# Patient Record
Sex: MALE | Race: WHITE | NOT HISPANIC OR LATINO
[De-identification: names, ages, dates, MRNs, and addresses within clinical notes are randomized per-mention and may not be internally consistent; named-entity substitution may affect disease eponyms.]

---

## 2022-05-18 PROBLEM — Z00.00 ENCOUNTER FOR PREVENTIVE HEALTH EXAMINATION: Status: ACTIVE | Noted: 2022-05-18

## 2022-05-19 ENCOUNTER — NON-APPOINTMENT (OUTPATIENT)
Age: 32
End: 2022-05-19

## 2022-05-19 ENCOUNTER — APPOINTMENT (OUTPATIENT)
Dept: UROLOGY | Facility: CLINIC | Age: 32
End: 2022-05-19
Payer: COMMERCIAL

## 2022-05-19 VITALS
RESPIRATION RATE: 20 BRPM | HEART RATE: 95 BPM | OXYGEN SATURATION: 98 % | WEIGHT: 155 LBS | TEMPERATURE: 98 F | BODY MASS INDEX: 20.99 KG/M2 | SYSTOLIC BLOOD PRESSURE: 110 MMHG | HEIGHT: 72 IN | DIASTOLIC BLOOD PRESSURE: 74 MMHG

## 2022-05-19 LAB
BILIRUB UR QL STRIP: NORMAL
CLARITY UR: CLEAR
COLLECTION METHOD: NORMAL
GLUCOSE UR-MCNC: NORMAL
HCG UR QL: 0.2 EU/DL
HGB UR QL STRIP.AUTO: NORMAL
KETONES UR-MCNC: NORMAL
LEUKOCYTE ESTERASE UR QL STRIP: NORMAL
NITRITE UR QL STRIP: NORMAL
PH UR STRIP: 7
PROT UR STRIP-MCNC: NORMAL
SP GR UR STRIP: 1.01

## 2022-05-19 PROCEDURE — 81003 URINALYSIS AUTO W/O SCOPE: CPT | Mod: QW

## 2022-05-19 PROCEDURE — 99204 OFFICE O/P NEW MOD 45 MIN: CPT

## 2022-05-19 NOTE — ASSESSMENT
[FreeTextEntry1] : We discussed the patient's chronic ED and a prescription reorder the medication for him discount pharmacies.  The tadalafil will come from [facility] and the [facility]  I discussed with the patient in detail the optimal way to use these 2 medications along with the indications risks alternatives and chances for success.  We also discussed a morning hormone panel and this was ordered for his potential low testosterone level.  In this regard we also discussed the potential effect that the varicocele could have on his testosterone level along with the effect he could have on testicular size and future fertility potential he tells me that he is not interested in treatment future fertility or present fertility and we will continue to assess the varicocele for the other 2 potential effects.  As for the asymptomatic microscopic hematuria found today, I did send urine for a micro exam a culture and cytology.  I recommended that this patient follow-up with me in 1 month to retest the urine to review the hormone values and to assess his response to therapy. Kaci Erwin MD\par

## 2022-05-19 NOTE — HISTORY OF PRESENT ILLNESS
[FreeTextEntry1] : 33 YO M seen TODAY as NPT for ED. He told me that he has had this problem especially with new partners for the last 15 years. He uses sildenafil 20 mg x 3 and tadalafil 5 mg x 5 PRN interchangeably and some times together to achieve a firm erection. He typically gets these medications on line.  He has also been found to have  low T level, but is asymptomatic. Patient takes finasteride 1 mg daily for hair growth and has bee on it for ~2 years. 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 NKMA.  The patient denies fevers, chills, nausea and or vomiting and no unexplained weight loss. \par All pertinent parts of the patient PFSH (past medical, family and social histories), laboratory, radiological studies and physician notes were reviewed prior to starting the face to face portion of the  visit. Questionnaire results were discussed with patient.\par

## 2022-05-19 NOTE — PHYSICAL EXAM
[General Appearance - Well Developed] : well developed [General Appearance - Well Nourished] : well nourished [Normal Appearance] : normal appearance [Well Groomed] : well groomed [General Appearance - In No Acute Distress] : no acute distress [Edema] : no peripheral edema [Respiration, Rhythm And Depth] : normal respiratory rhythm and effort [Exaggerated Use Of Accessory Muscles For Inspiration] : no accessory muscle use [Abdomen Soft] : soft [Abdomen Tenderness] : non-tender [Abdomen Hernia] : no hernia was discovered [Costovertebral Angle Tenderness] : no ~M costovertebral angle tenderness [Urethral Meatus] : meatus normal [Penis Abnormality] : normal uncircumcised penis [Urinary Bladder Findings] : the bladder was normal on palpation [Scrotum] : the scrotum was normal [Epididymis] : the epididymides were normal [Testes Tenderness] : no tenderness of the testes [Testes Mass (___cm)] : there were no testicular masses [FreeTextEntry1] : LArge left varicocele, empties supine, fills with Valsalva in erect position.  [Normal Station and Gait] : the gait and station were normal for the patient's age [] : no rash [No Focal Deficits] : no focal deficits [Oriented To Time, Place, And Person] : oriented to person, place, and time [Affect] : the affect was normal [Mood] : the mood was normal [Not Anxious] : not anxious

## 2022-05-20 LAB
APPEARANCE: CLEAR
BACTERIA: NEGATIVE
BILIRUBIN URINE: NEGATIVE
BLOOD URINE: NORMAL
COLOR: NORMAL
GLUCOSE QUALITATIVE U: NEGATIVE
HYALINE CASTS: 0 /LPF
KETONES URINE: NEGATIVE
LEUKOCYTE ESTERASE URINE: NEGATIVE
MICROSCOPIC-UA: NORMAL
NITRITE URINE: NEGATIVE
PH URINE: 7
PROTEIN URINE: NEGATIVE
RED BLOOD CELLS URINE: 1 /HPF
SPECIFIC GRAVITY URINE: 1.01
SQUAMOUS EPITHELIAL CELLS: 0 /HPF
UROBILINOGEN URINE: NORMAL
WHITE BLOOD CELLS URINE: 1 /HPF

## 2022-05-23 LAB — BACTERIA UR CULT: NORMAL

## 2022-05-25 LAB — URINE CYTOLOGY: NORMAL

## 2022-05-29 ENCOUNTER — TRANSCRIPTION ENCOUNTER (OUTPATIENT)
Age: 32
End: 2022-05-29

## 2022-06-01 LAB
ESTRADIOL SERPL-MCNC: 21 PG/ML
FSH SERPL-MCNC: 5.1 IU/L
LH SERPL-ACNC: 3.1 IU/L
PROLACTIN SERPL-MCNC: 6.4 NG/ML
TESTOST SERPL-MCNC: 589 NG/DL

## 2023-05-02 ENCOUNTER — NON-APPOINTMENT (OUTPATIENT)
Age: 33
End: 2023-05-02

## 2023-05-02 ENCOUNTER — TRANSCRIPTION ENCOUNTER (OUTPATIENT)
Age: 33
End: 2023-05-02

## 2023-05-03 ENCOUNTER — LABORATORY RESULT (OUTPATIENT)
Age: 33
End: 2023-05-03

## 2023-05-23 NOTE — HISTORY OF PRESENT ILLNESS
[FreeTextEntry1] : 34 YO M seen  as NPT for ED. He told me that he has had this problem especially with new partners for the last 15 years. He uses sildenafil 20 mg x 3 and tadalafil 5 mg x 5 PRN interchangeably and some times together to achieve a firm erection. He typically gets these medications on line.  He has also been found to have  low T level, but is asymptomatic. Patient takes finasteride 1 mg daily for hair growth and has bee on it for ~2 years. 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 NKMA.  The patient denies fevers, chills, nausea and or vomiting and no unexplained weight loss. \par All pertinent parts of the patient PFSH (past medical, family and social histories), laboratory, radiological studies and physician notes were reviewed prior to starting the face to face portion of the  visit. Questionnaire results were discussed with patient.\par We discussed the patient's chronic ED and a prescription  will reorder the medications for him from  discount pharmacies. The tadalafil will come from [facility] and the sildenafil will come from [facility]. I discussed with the patient in detail the optimal way to use these 2 medications along with the indications risks alternatives and chances for success. We also discussed a morning hormone panel and this was ordered for his potential low testosterone level. In this regard we also discussed the potential effect that the varicocele could have on his testosterone level along with the effect he could have on testicular size and future fertility potential he tells me that he is not interested in treatment future fertility or present fertility and we will continue to assess the varicocele for the other 2 potential effects. As for the asymptomatic microscopic hematuria found today, I did send urine for a micro exam a culture and cytology. I recommended that this patient follow-up with me in 1 month to retest the urine to review the hormone values and to assess his response to therapy. \par LH 3.1\par \par PRO 6.4\par FSH 5.1\par E2 21\par \par Patient seen TODAY 5/25/2023 to reassess. \par Recent lab work from 5/3/2023:\par LH 3.1\par \par FT 2.5 (Low)\par PRO 7.3\par FSH 5.1\par E2 18\par .

## 2023-05-25 ENCOUNTER — NON-APPOINTMENT (OUTPATIENT)
Age: 33
End: 2023-05-25

## 2023-05-26 ENCOUNTER — APPOINTMENT (OUTPATIENT)
Dept: UROLOGY | Facility: CLINIC | Age: 33
End: 2023-05-26

## 2023-06-22 ENCOUNTER — APPOINTMENT (OUTPATIENT)
Dept: UROLOGY | Facility: CLINIC | Age: 33
End: 2023-06-22
Payer: COMMERCIAL

## 2023-06-22 VITALS
HEIGHT: 72 IN | OXYGEN SATURATION: 96 % | SYSTOLIC BLOOD PRESSURE: 98 MMHG | WEIGHT: 158.73 LBS | TEMPERATURE: 98 F | HEART RATE: 88 BPM | DIASTOLIC BLOOD PRESSURE: 67 MMHG | BODY MASS INDEX: 21.5 KG/M2

## 2023-06-22 DIAGNOSIS — N52.01 ERECTILE DYSFUNCTION DUE TO ARTERIAL INSUFFICIENCY: ICD-10-CM

## 2023-06-22 DIAGNOSIS — I86.1 SCROTAL VARICES: ICD-10-CM

## 2023-06-22 LAB
BILIRUB UR QL STRIP: NORMAL
CLARITY UR: CLEAR
COLLECTION METHOD: NORMAL
GLUCOSE UR-MCNC: NORMAL
HCG UR QL: 0.2 EU/DL
HGB UR QL STRIP.AUTO: NORMAL
KETONES UR-MCNC: NORMAL
LEUKOCYTE ESTERASE UR QL STRIP: NORMAL
NITRITE UR QL STRIP: NORMAL
PH UR STRIP: 6.5
PROT UR STRIP-MCNC: NORMAL
SP GR UR STRIP: 1.01

## 2023-06-22 PROCEDURE — 99214 OFFICE O/P EST MOD 30 MIN: CPT

## 2023-06-22 PROCEDURE — 81003 URINALYSIS AUTO W/O SCOPE: CPT | Mod: QW

## 2023-06-22 RX ORDER — SILDENAFIL 100 MG/1
100 TABLET, FILM COATED ORAL
Qty: 30 | Refills: 2 | Status: ACTIVE | COMMUNITY
Start: 2022-05-19 | End: 1900-01-01

## 2023-06-22 RX ORDER — TADALAFIL 5 MG/1
5 TABLET ORAL
Qty: 90 | Refills: 3 | Status: ACTIVE | COMMUNITY
Start: 2022-05-19 | End: 1900-01-01

## 2023-06-22 NOTE — ASSESSMENT
[FreeTextEntry1] : We discussed the patient's good response to tadalafil and sildenafil for his ED and these were renewed today for him.  I recommended that he continue on this medication as needed since it is the most appropriate medicine and method to treat his ADD.\par \par As for the left varicocele which remains present, I reviewed with him the potential effects of the varicocele on ED fertility and hormone production.  He has no indication that this varicocele is having any effect on his hormone production or ED at this point.  And it is not causing him pain.  As for the fertility question, I did give him a requisition for a semen analysis and we will review this by phone after it is done.  If this remains stable then I do not recommend that he undergo treatment for the varicocele at this time.\par \par As for the microscopic hematuria again seen today, on his initial visit urine was sent for microscopic evaluation and did did not confirm the dipstick result.  I again sent urine for culture cytology and microscopic UA and we will await these results before proceeding to any further testing at this time.  If all of the above tests remain normal, then follow-up in 1 year.

## 2023-06-22 NOTE — HISTORY OF PRESENT ILLNESS
[FreeTextEntry1] : 34 YO M seen  as NPT for ED. He told me that he has had this problem especially with new partners for the last 15 years. He uses sildenafil 20 mg x 3 and tadalafil 5 mg x 5 PRN interchangeably and some times together to achieve a firm erection. He typically gets these medications on line.  He has also been found to have  low T level, but is asymptomatic. Patient takes finasteride 1 mg daily for hair growth and has bee on it for ~2 years. 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 NKMA.  The patient denies fevers, chills, nausea and or vomiting and no unexplained weight loss. \par All pertinent parts of the patient PFSH (past medical, family and social histories), laboratory, radiological studies and physician notes were reviewed prior to starting the face to face portion of the  visit. Questionnaire results were discussed with patient.\par We discussed the patient's chronic ED and a prescription  will reorder the medications for him from  discount pharmacies. The tadalafil will come from [facility] and the sildenafil will come from [facility]. I discussed with the patient in detail the optimal way to use these 2 medications along with the indications risks alternatives and chances for success. We also discussed a morning hormone panel and this was ordered for his potential low testosterone level. In this regard we also discussed the potential effect that the varicocele could have on his testosterone level along with the effect he could have on testicular size and future fertility potential he tells me that he is not interested in treatment future fertility or present fertility and we will continue to assess the varicocele for the other 2 potential effects. As for the asymptomatic microscopic hematuria found today, I did send urine for a micro exam a culture and cytology. I recommended that this patient follow-up with me in 1 month to retest the urine to review the hormone values and to assess his response to therapy. \par LH 3.1\par \par PRO 6.4\par FSH 5.1\par E2 21\par UA micro 1 RBC/HPF\par C+S, cytology both negative\par \par Patient seen TODAY 6/22/2023 to reassess. He told me that his urination is fine and the ED is much improved on the combo of tadalafil 5 mg daily and sildenafil 100 mg PRN and requests a refill. He denies any gross hematuria. Patient is concerned over possible effects of the varicocele on T production and/or fertility.\par Recent lab work from 5/3/2023:\par LH 3.1\par \par FT 2.5 (Low)\par PRO 7.3\par FSH 5.1\par E2 18\par UA trace RBC\par IPSS 4\par

## 2023-06-22 NOTE — PHYSICAL EXAM
[General Appearance - Well Developed] : well developed [General Appearance - Well Nourished] : well nourished [Normal Appearance] : normal appearance [Well Groomed] : well groomed [General Appearance - In No Acute Distress] : no acute distress [Abdomen Soft] : soft [Abdomen Tenderness] : non-tender [Abdomen Hernia] : no hernia was discovered [Costovertebral Angle Tenderness] : no ~M costovertebral angle tenderness [Urethral Meatus] : meatus normal [Penis Abnormality] : normal uncircumcised penis [Urinary Bladder Findings] : the bladder was normal on palpation [Scrotum] : the scrotum was normal [Epididymis] : the epididymides were normal [Testes Tenderness] : no tenderness of the testes [Testes Mass (___cm)] : there were no testicular masses [FreeTextEntry1] : Left variccele remains present but not as large today as on last evaluation. [Edema] : no peripheral edema [] : no respiratory distress [Respiration, Rhythm And Depth] : normal respiratory rhythm and effort [Oriented To Time, Place, And Person] : oriented to person, place, and time [Exaggerated Use Of Accessory Muscles For Inspiration] : no accessory muscle use [Affect] : the affect was normal [Mood] : the mood was normal [Not Anxious] : not anxious

## 2023-06-23 ENCOUNTER — NON-APPOINTMENT (OUTPATIENT)
Age: 33
End: 2023-06-23

## 2023-06-23 LAB
APPEARANCE: CLEAR
BACTERIA: NEGATIVE /HPF
BILIRUBIN URINE: NEGATIVE
BLOOD URINE: NEGATIVE
CAST: 0 /LPF
COLOR: YELLOW
EPITHELIAL CELLS: 0 /HPF
GLUCOSE QUALITATIVE U: NEGATIVE MG/DL
KETONES URINE: NEGATIVE MG/DL
LEUKOCYTE ESTERASE URINE: NEGATIVE
MICROSCOPIC-UA: NORMAL
NITRITE URINE: NEGATIVE
PH URINE: 6.5
PROTEIN URINE: NEGATIVE MG/DL
RED BLOOD CELLS URINE: 9 /HPF
REVIEW: NORMAL
SPECIFIC GRAVITY URINE: 1.02
URINE CYTOLOGY: NORMAL
UROBILINOGEN URINE: 1 MG/DL
WHITE BLOOD CELLS URINE: 0 /HPF

## 2023-06-26 ENCOUNTER — NON-APPOINTMENT (OUTPATIENT)
Age: 33
End: 2023-06-26

## 2023-06-26 LAB — BACTERIA UR CULT: NORMAL

## 2023-06-27 ENCOUNTER — NON-APPOINTMENT (OUTPATIENT)
Age: 33
End: 2023-06-27

## 2023-06-27 ENCOUNTER — TRANSCRIPTION ENCOUNTER (OUTPATIENT)
Age: 33
End: 2023-06-27

## 2024-04-22 ENCOUNTER — APPOINTMENT (OUTPATIENT)
Dept: INTERNAL MEDICINE | Facility: CLINIC | Age: 34
End: 2024-04-22

## 2024-05-02 ENCOUNTER — APPOINTMENT (OUTPATIENT)
Dept: UROLOGY | Facility: CLINIC | Age: 34
End: 2024-05-02
Payer: COMMERCIAL

## 2024-05-02 DIAGNOSIS — R31.21 ASYMPTOMATIC MICROSCOPIC HEMATURIA: ICD-10-CM

## 2024-05-02 DIAGNOSIS — Z30.09 ENCOUNTER FOR OTHER GENERAL COUNSELING AND ADVICE ON CONTRACEPTION: ICD-10-CM

## 2024-05-02 LAB
BILIRUB UR QL STRIP: NORMAL
CLARITY UR: CLEAR
COLLECTION METHOD: NORMAL
GLUCOSE UR-MCNC: NORMAL
HCG UR QL: 1 EU/DL
HGB UR QL STRIP.AUTO: NORMAL
KETONES UR-MCNC: NORMAL
LEUKOCYTE ESTERASE UR QL STRIP: NORMAL
NITRITE UR QL STRIP: NORMAL
PH UR STRIP: 6
PROT UR STRIP-MCNC: NORMAL
SP GR UR STRIP: 1.02

## 2024-05-02 PROCEDURE — 99214 OFFICE O/P EST MOD 30 MIN: CPT

## 2024-05-02 PROCEDURE — 81003 URINALYSIS AUTO W/O SCOPE: CPT | Mod: QW

## 2024-05-02 NOTE — ASSESSMENT
[FreeTextEntry1] : MICHAEL RANGEL would be  a good candidate for the no-scalpel vasectomy performed in the office under local anesthesia and I discussed this in detail with him including the risks, alternatives, and chances for success, that he should consider this as a permanent procedure, that he should refrain from any aspirin, Advil, or Aleve for a week prior to the procedure, that we would require two sperm counts before we allow him to use this for birth control, and  that procedure is 99% successful.   We discussed alternatives to reversing a vasectomy if necessary, one of which is sperm banking. The other would be testicular sperm extraction at that time if necessary in the future. Both of these would likely require IVF/ICSI to be successful. I discussed with him the unlikely chance of postoperative pain, fever, swelling, infection, bleeding, numbness, testicular atrophy, or persistent sperm in the ejaculate and the unlikely chance that the vasectomy would affect other health issues moving forward.   I gave him an information sheet reviewing this information and a consent form was signed.  As for the persistent microscopic hematuria, this has never been fully evaluated and I strongly recommended that he undergo CT scan and cystoscopy. He is not ready to commit to these tests but will consider them. Urine was sent today for C+S and cytology. Kaci Erwin MD

## 2024-05-02 NOTE — PHYSICAL EXAM
[Normal Appearance] : normal appearance [Well Groomed] : well groomed [Edema] : no peripheral edema [] : no respiratory distress [Abdomen Soft] : soft [Urethral Meatus] : meatus normal [Penis Abnormality] : normal uncircumcised penis [Epididymis] : the epididymides were normal [Testes Tenderness] : no tenderness of the testes [Testes Mass (___cm)] : there were no testicular masses [Normal Station and Gait] : the gait and station were normal for the patient's age [Skin Turgor] : supple [Oriented To Time, Place, And Person] : oriented to person, place, and time [Affect] : the affect was normal [Mood] : the mood was normal [Not Anxious] : not anxious [de-identified] : Vasa easily palpable in a flaccid scrotum.

## 2024-05-02 NOTE — HISTORY OF PRESENT ILLNESS
[FreeTextEntry1] : 34 YO M seen  as NPT for ED. He told me that he has had this problem especially with new partners for the last 15 years. He uses sildenafil 20 mg x 3 and tadalafil 5 mg x 5 PRN interchangeably and some times together to achieve a firm erection. He typically gets these medications on line.  He has also been found to have  low T level, but is asymptomatic. Patient takes finasteride 1 mg daily for hair growth and has bee on it for ~2 years.  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 NKMA.  The patient denies fevers, chills, nausea and or vomiting and no unexplained weight loss.  All pertinent parts of the patient PFSH (past medical, family and social histories), laboratory, radiological studies and physician notes were reviewed prior to starting the face to face portion of the  visit. Questionnaire results were discussed with patient. We discussed the patient's chronic ED and a prescription  will reorder the medications for him from  discount pharmacies. The tadalafil will come from [email or web address] and the sildenafil will come from [facility]. I discussed with the patient in detail the optimal way to use these 2 medications along with the indications risks alternatives and chances for success. We also discussed a morning hormone panel and this was ordered for his potential low testosterone level. In this regard we also discussed the potential effect that the varicocele could have on his testosterone level along with the effect he could have on testicular size and future fertility potential he tells me that he is not interested in treatment future fertility or present fertility and we will continue to assess the varicocele for the other 2 potential effects. As for the asymptomatic microscopic hematuria found today, I did send urine for a micro exam a culture and cytology. I recommended that this patient follow-up with me in 1 month to retest the urine to review the hormone values and to assess his response to therapy.  LH 3.1  PRO 6.4 FSH 5.1 E2 21 UA micro 1 RBC/HPF C+S, cytology both negative  Patient seen 6/22/2023 to reassess. He told me that his urination is fine and the ED is much improved on the combo of tadalafil 5 mg daily and sildenafil 100 mg PRN and requests a refill. He denies any gross hematuria. Patient is concerned over possible effects of the varicocele on T production and/or fertility. Recent lab work from 5/3/2023: LH 3.1  FT 2.5 (Low) PRO 7.3 FSH 5.1 E2 18 UA trace RBC IPSS 4 We discussed the patient's good response to tadalafil and sildenafil for his ED and these were renewed today for him. I recommended that he continue on this medication as needed since it is the most appropriate medicine and method to treat his ADD. As for the left varicocele which remains present, I reviewed with him the potential effects of the varicocele on ED fertility and hormone production. He has no indication that this varicocele is having any effect on his hormone production or ED at this point. And it is not causing him pain. As for the fertility question, I did give him a requisition for a semen analysis and we will review this by phone after it is done. If this remains stable then I do not recommend that he undergo treatment for the varicocele at this time. As for the microscopic hematuria again seen today, on his initial visit urine was sent for microscopic evaluation and did not confirm the dipstick result. I again sent urine for culture cytology and microscopic UA and we will await these results before proceeding to any further testing at this time. If all of the above tests remain normal, then follow-up in 1 year. LVM regarding UA micro 9 RBC/HPF, negative urine C+S and cytology. Repeat UA in 1 month. Requisition to be emailed to pt. Advised to call with any questions.  Patient seen TODAY 5/2/2024 to reassess. He tells me that he is no longer concerned over his fertility potential and would like to have a vasectomy.  He desires permanent sterilization, has no offspring, is not in a relationship at this time. He also told me that is known to carry one allele for Alport's syndrome and feels that this explains his microscopic hematuria that has not been evaluated as of yet.  UA moderate RBC IPSS 2 GREGG 0 SANDEE 17

## 2024-05-10 LAB
BACTERIA UR CULT: NORMAL
URINE CYTOLOGY: NORMAL

## 2025-02-12 ENCOUNTER — RX RENEWAL (OUTPATIENT)
Age: 35
End: 2025-02-12